# Patient Record
Sex: FEMALE | Race: WHITE
[De-identification: names, ages, dates, MRNs, and addresses within clinical notes are randomized per-mention and may not be internally consistent; named-entity substitution may affect disease eponyms.]

---

## 2017-01-12 ENCOUNTER — HOSPITAL ENCOUNTER (EMERGENCY)
Age: 42
Discharge: HOME | End: 2017-01-12
Payer: COMMERCIAL

## 2017-01-12 DIAGNOSIS — L30.1: Primary | ICD-10-CM

## 2017-01-12 DIAGNOSIS — I10: ICD-10-CM

## 2017-01-12 PROCEDURE — 99283 EMERGENCY DEPT VISIT LOW MDM: CPT

## 2017-08-20 ENCOUNTER — HOSPITAL ENCOUNTER (OUTPATIENT)
Dept: HOSPITAL 76 - EMS | Age: 42
Discharge: ANOTHER HOSPITAL | End: 2017-08-20
Attending: SURGERY
Payer: COMMERCIAL

## 2017-08-20 ENCOUNTER — HOSPITAL ENCOUNTER (EMERGENCY)
Dept: HOSPITAL 76 - ED | Age: 42
LOS: 1 days | Discharge: HOME | End: 2017-08-21
Payer: COMMERCIAL

## 2017-08-20 DIAGNOSIS — S50.811A: ICD-10-CM
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
## 2017-08-20 DIAGNOSIS — I10: ICD-10-CM

## 2017-08-20 DIAGNOSIS — R07.89: Primary | ICD-10-CM
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
## 2017-08-20 DIAGNOSIS — S20.219A: ICD-10-CM

## 2017-08-20 DIAGNOSIS — S30.0XXA: ICD-10-CM
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
## 2017-08-20 DIAGNOSIS — S16.1XXA: Primary | ICD-10-CM
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
## 2017-08-20 PROCEDURE — 71250 CT THORAX DX C-: CPT

## 2017-08-20 PROCEDURE — 72125 CT NECK SPINE W/O DYE: CPT

## 2017-08-20 PROCEDURE — 72170 X-RAY EXAM OF PELVIS: CPT

## 2017-08-20 PROCEDURE — 99283 EMERGENCY DEPT VISIT LOW MDM: CPT

## 2017-08-20 PROCEDURE — 99284 EMERGENCY DEPT VISIT MOD MDM: CPT
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
## 2017-08-20 NOTE — CT PRELIMINARY REPORT
Accession: [id number]

Exam: CT Chest W/O

 

IMPRESSION: Negative noncontrast chest CT.

 

[facility]

 

SITE ID: [id number]

## 2017-08-20 NOTE — CT PRELIMINARY REPORT
Accession: [id number]

Exam: CT Cervical Spine W/O

 

IMPRESSION: 

1. No acute cervical spine fracture. 

2. Mild multilevel degenerative changes without spinal canal or foraminal stenosis.

 

[facility]

 

SITE ID: [id number]

## 2017-08-20 NOTE — XRAY PRELIMINARY REPORT
Accession: [id number]

Exam: XR Pelvis 1 View

 

IMPRESSION: Negative single view pelvis radiography.

 

[facility]

 

SITE ID: [id number]
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
## 2017-08-21 VITALS — DIASTOLIC BLOOD PRESSURE: 82 MMHG | SYSTOLIC BLOOD PRESSURE: 142 MMHG

## 2018-01-07 ENCOUNTER — HOSPITAL ENCOUNTER (EMERGENCY)
Dept: HOSPITAL 76 - ED | Age: 43
Discharge: HOME | End: 2018-01-07
Payer: COMMERCIAL

## 2018-01-07 VITALS — SYSTOLIC BLOOD PRESSURE: 124 MMHG | DIASTOLIC BLOOD PRESSURE: 82 MMHG

## 2018-01-07 DIAGNOSIS — J10.1: Primary | ICD-10-CM

## 2018-01-07 DIAGNOSIS — R00.0: ICD-10-CM

## 2018-01-07 DIAGNOSIS — I10: ICD-10-CM

## 2018-01-07 DIAGNOSIS — R07.9: ICD-10-CM

## 2018-01-07 LAB
ALBUMIN DIAFP-MCNC: 3.9 G/DL (ref 3.2–5.5)
ALBUMIN/GLOB SERPL: 0.9 {RATIO} (ref 1–2.2)
ALP SERPL-CCNC: 60 IU/L (ref 42–121)
ALT SERPL W P-5'-P-CCNC: 61 IU/L (ref 10–60)
ANION GAP SERPL CALCULATED.4IONS-SCNC: 11 MMOL/L (ref 6–13)
AST SERPL W P-5'-P-CCNC: 52 IU/L (ref 10–42)
BASOPHILS NFR BLD AUTO: 0.1 10^3/UL (ref 0–0.1)
BASOPHILS NFR BLD AUTO: 0.6 %
BILIRUB BLD-MCNC: 0.4 MG/DL (ref 0.2–1)
BUN SERPL-MCNC: 12 MG/DL (ref 6–20)
CALCIUM UR-MCNC: 9 MG/DL (ref 8.5–10.3)
CHLORIDE SERPL-SCNC: 105 MMOL/L (ref 101–111)
CO2 SERPL-SCNC: 16 MMOL/L (ref 21–32)
CREAT SERPLBLD-SCNC: 0.8 MG/DL (ref 0.4–1)
EOSINOPHIL # BLD AUTO: 0.1 10^3/UL (ref 0–0.7)
EOSINOPHIL NFR BLD AUTO: 0.9 %
ERYTHROCYTE [DISTWIDTH] IN BLOOD BY AUTOMATED COUNT: 14.7 % (ref 12–15)
GFRSERPLBLD MDRD-ARVRAT: 79 ML/MIN/{1.73_M2} (ref 89–?)
GLOBULIN SER-MCNC: 4.4 G/DL (ref 2.1–4.2)
GLUCOSE SERPL-MCNC: 132 MG/DL (ref 70–100)
HGB UR QL STRIP: 12.8 G/DL (ref 12–16)
INFLUENZA A & B AG INTERPRET: (no result)
LIPASE SERPL-CCNC: 16 U/L (ref 22–51)
LYMPHOCYTES # SPEC AUTO: 0.9 10^3/UL (ref 1.5–3.5)
LYMPHOCYTES NFR BLD AUTO: 9 %
MCH RBC QN AUTO: 25.1 PG (ref 27–31)
MCHC RBC AUTO-ENTMCNC: 32.9 G/DL (ref 32–36)
MCV RBC AUTO: 76.5 FL (ref 81–99)
MONOCYTES # BLD AUTO: 0.9 10^3/UL (ref 0–1)
MONOCYTES NFR BLD AUTO: 8.4 %
NEUTROPHILS # BLD AUTO: 8.4 10^3/UL (ref 1.5–6.6)
NEUTROPHILS # SNV AUTO: 10.3 X10^3/UL (ref 4.8–10.8)
NEUTROPHILS NFR BLD AUTO: 81.1 %
PDW BLD AUTO: 7.9 FL (ref 7.9–10.8)
PLATELET # BLD: 227 10^3/UL (ref 130–450)
PROT SPEC-MCNC: 8.3 G/DL (ref 6.7–8.2)
RBC MAR: 5.09 10^6/UL (ref 4.2–5.4)
SODIUM SERPLBLD-SCNC: 132 MMOL/L (ref 135–145)

## 2018-01-07 PROCEDURE — 84484 ASSAY OF TROPONIN QUANT: CPT

## 2018-01-07 PROCEDURE — 93005 ELECTROCARDIOGRAM TRACING: CPT

## 2018-01-07 PROCEDURE — 83690 ASSAY OF LIPASE: CPT

## 2018-01-07 PROCEDURE — 96361 HYDRATE IV INFUSION ADD-ON: CPT

## 2018-01-07 PROCEDURE — 87276 INFLUENZA A AG IF: CPT

## 2018-01-07 PROCEDURE — 36415 COLL VENOUS BLD VENIPUNCTURE: CPT

## 2018-01-07 PROCEDURE — 99283 EMERGENCY DEPT VISIT LOW MDM: CPT

## 2018-01-07 PROCEDURE — 71045 X-RAY EXAM CHEST 1 VIEW: CPT

## 2018-01-07 PROCEDURE — 87275 INFLUENZA B AG IF: CPT

## 2018-01-07 PROCEDURE — 80053 COMPREHEN METABOLIC PANEL: CPT

## 2018-01-07 PROCEDURE — 96374 THER/PROPH/DIAG INJ IV PUSH: CPT

## 2018-01-07 PROCEDURE — 85025 COMPLETE CBC W/AUTO DIFF WBC: CPT

## 2018-01-07 PROCEDURE — 96375 TX/PRO/DX INJ NEW DRUG ADDON: CPT

## 2018-01-07 NOTE — ED PHYSICIAN DOCUMENTATION
History of Present Illness





- Stated complaint


Stated Complaint: CHEST PX/VOMITING





- Chief complaint


Chief Complaint: Cardiac





- History obtained from


History obtained from: Patient





- History of Present Illness


Timing: Yesterday





- Additonal information


Additional information: 





Patient is a 42 year old female with a history of htn and depression who is 

presenting to the emergency department for fevers, chills, sore throat, vomiting

, and body aches.  patient states that her symptoms started with a sore throat 

three days ago.  patient states that over the last few days her symptoms have 

become progressively worse.  patient states that today she developed chest pain 

and shortness of breath with her other symptoms.  





Review of Systems


Constitutional: reports: Fever, Chills, Myalgias, Fatigue, Sweats


Eyes: denies: Loss of vision


Ears: denies: Ear pain


Nose: reports: Rhinorrhea / runny nose, Congestion


Throat: reports: Sore throat


Cardiac: reports: Chest pain / pressure


Respiratory: reports: Dyspnea, Cough


GI: reports: Abdominal Pain, Nausea, Vomiting


: denies: Dysuria, Frequency


Skin: denies: Rash, Lesions


Neurologic: reports: Generalized weakness.  denies: Focal weakness, Numbness





PD PAST MEDICAL HISTORY





- Past Medical History


Cardiovascular: Hypertension


Endocrine/Autoimmune: None


GI: Cholelithiasis


: None


Psych: Depression, Anxiety


Musculoskeletal: None


Derm: None





- Past Surgical History


Past Surgical History: Yes


General: Cholecystectomy





- Present Medications


Home Medications: 


 Ambulatory Orders











 Medication  Instructions  Recorded  Confirmed


 


Atenolol 50 mg PO DAILY 12/31/14 01/07/18


 


FLUoxetine [PROzac] 20 mg PO DAILY 01/07/18 01/07/18


 


Ondansetron Odt [Zofran] 4 mg TL Q6H PRN #20 tablet 01/07/18 














- Allergies


Allergies/Adverse Reactions: 


 Allergies











Allergy/AdvReac Type Severity Reaction Status Date / Time


 


Penicillins Allergy  Anaphylaxis Verified 01/07/18 20:19














- Social History


Does the pt smoke?: No


Smoking Status: Never smoker


Does the pt drink ETOH?: Yes


Does the pt have substance abuse?: Yes





- Immunizations


Immunizations are current?: Yes





- POLST


Patient has POLST: No





PD ED PE NORMAL





- HEENT


HEENT: Atraumatic





- Neck


Neck: No JVD





- Respiratory


Respiratory: No respiratory distress, Clear bilaterally





- Abdomen


Abdomen: Soft, Non tender, Non distended





- Derm


Derm: Normal color





- Extremities


Extremities: No deformity, No edema





- Neuro


Neuro: Alert and oriented X 3, No motor deficit, No sensory deficit, Normal 

speech





PD ED PE EXPANDED





- General


General: Alert, In distress





- HEENT


HEENT: Nasal congestion, Rhinorrhea





- Cardiac


Cardiac: Tachy





- Derm


Derm: Diaphoretic





Results





- Vitals


Vitals: 


 Vital Signs - 24 hr











  01/07/18 01/07/18





  20:14 21:28


 


Temperature 38.4 C H 


 


Heart Rate 120 H 118 H


 


Respiratory 28 H 18





Rate  


 


Blood Pressure 149/97 H 131/86 H


 


O2 Saturation 100 98








 Oxygen











O2 Source                      Room air

















- EKG (time done)


  ** 2023


Rate: Rate (enter#) (117)


Rhythm: Sinus tachycardia


Axis: Normal


Intervals: Normal UT


QRS: Normal


Compare to prior EKG: Old EKG unavailable





- Labs


Labs: 


 Laboratory Tests











  01/07/18 01/07/18 01/07/18





  20:23 20:25 20:25


 


WBC   10.3 


 


RBC   5.09 


 


Hgb   12.8 


 


Hct   38.9 


 


MCV   76.5 L 


 


MCH   25.1 L 


 


MCHC   32.9 


 


RDW   14.7 


 


Plt Count   227 


 


MPV   7.9 


 


Neut #   8.4 H 


 


Lymph #   0.9 L 


 


Mono #   0.9 


 


Eos #   0.1 


 


Baso #   0.1 


 


Absolute Nucleated RBC   0.00 


 


Nucleated RBC %   0.0 


 


Sodium    132 L


 


Potassium    3.6


 


Chloride    105


 


Carbon Dioxide    16 L


 


Anion Gap    11.0


 


BUN    12


 


Creatinine    0.8


 


Estimated GFR (MDRD)    79 L


 


Glucose    132 H


 


Calcium    9.0


 


Total Bilirubin    0.4


 


AST    52 H


 


ALT    61 H


 


Alkaline Phosphatase    60


 


Troponin I   


 


Total Protein    8.3 H


 


Albumin    3.9


 


Globulin    4.4 H


 


Albumin/Globulin Ratio    0.9 L


 


Lipase    16 L


 


Influenza A (Rapid)  POSITIVE H  


 


Influenza B (Rapid)  Negative  


 


Influenza Types A,B Ag  + H  














  01/07/18





  20:25


 


WBC 


 


RBC 


 


Hgb 


 


Hct 


 


MCV 


 


MCH 


 


MCHC 


 


RDW 


 


Plt Count 


 


MPV 


 


Neut # 


 


Lymph # 


 


Mono # 


 


Eos # 


 


Baso # 


 


Absolute Nucleated RBC 


 


Nucleated RBC % 


 


Sodium 


 


Potassium 


 


Chloride 


 


Carbon Dioxide 


 


Anion Gap 


 


BUN 


 


Creatinine 


 


Estimated GFR (MDRD) 


 


Glucose 


 


Calcium 


 


Total Bilirubin 


 


AST 


 


ALT 


 


Alkaline Phosphatase 


 


Troponin I  < 0.04


 


Total Protein 


 


Albumin 


 


Globulin 


 


Albumin/Globulin Ratio 


 


Lipase 


 


Influenza A (Rapid) 


 


Influenza B (Rapid) 


 


Influenza Types A,B Ag 














- Rads (name of study)


  ** chest x-ray


Radiology: Final report received (no acute abnormality)





PD MEDICAL DECISION MAKING





- ED course


Complexity details: reviewed old records, reviewed results, re-evaluated patient

, considered differential, d/w patient


ED course: 





Patient was seen and examined at bedside.  IV access was gained, labs were 

drawn.  Fluid bolus was started.  ekg was performed and showed sinus tach.  

patient was treated with toradol, zofran.  Patient's flu was was positive.  A 

second liter bolus was ordered as well as tylenol.  Patient's tachycardia and 

fever improved.  Patient had no episodes of emesis while in the emergency 

department.  patient required no further work up and was stable for discharge 

with outpatient follow up.  





Departure





- Departure


Disposition: 01 Home, Self Care


Clinical Impression: 


 Influenza A





Condition: Good


Instructions:  ED Flu


Follow-Up: 


primary,care provider [Other] - As Needed


Prescriptions: 


Ondansetron Odt [Zofran] 4 mg TL Q6H PRN #20 tablet


 PRN Reason: Nausea / Vomiting


Comments: 


Your symptoms today are being caused by influenza A.  There is nothing to kill 

the virus specifically so we treat the symptoms.  You will be prescribed zofran 

for nausea and can take over the counter cold and flu medicine.  It is 

important to stay well hydrated with at least 100oz a day.  You are contagious 

so it is important that you wash your hands religiously.  You should follow up 

with your doctor if your symptoms persist for more than the next 4 days.  You 

may return to the emergency department at any time for new, worsening or 

uncontrollable symptoms.  


Forms:  Activity restrictions

## 2018-01-07 NOTE — XRAY PRELIMINARY REPORT
Accession: [id number]

Exam: XR CHEST 1 VIEW X-RAY

 

IMPRESSION: No acute cardiopulmonary abnormality. 

 

[facility]

 

SITE ID: [id number]

## 2018-01-07 NOTE — XRAY REPORT
EXAM: 

CHEST RADIOGRAPHY

 

EXAM DATE: 1/7/2018 09:07 PM.

 

CLINICAL HISTORY: Fever and cough

 

COMPARISON: CT chest 08/20/2017.

 

TECHNIQUE: 1 view.

 

FINDINGS:

Lungs/Pleura: No focal consolidation or evidence of edema. No pleural effusion or pneumothorax. 

 

Mediastinum: Normal cardiomediastinal contour. 

 

Other: The bones are unremarkable.

 

IMPRESSION: No acute cardiopulmonary abnormality. 

 

[facility]

Referring Provider Line: [phone]

 

SITE ID: [id number]

## 2018-03-04 ENCOUNTER — HOSPITAL ENCOUNTER (EMERGENCY)
Dept: HOSPITAL 76 - ED | Age: 43
Discharge: HOME | End: 2018-03-04
Payer: COMMERCIAL

## 2018-03-04 VITALS — SYSTOLIC BLOOD PRESSURE: 141 MMHG | DIASTOLIC BLOOD PRESSURE: 98 MMHG

## 2018-03-04 DIAGNOSIS — M62.838: ICD-10-CM
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
## 2018-03-04 DIAGNOSIS — S16.1XXA: Primary | ICD-10-CM

## 2018-03-04 DIAGNOSIS — I10: ICD-10-CM

## 2018-03-04 PROCEDURE — 96372 THER/PROPH/DIAG INJ SC/IM: CPT

## 2018-03-04 PROCEDURE — 99283 EMERGENCY DEPT VISIT LOW MDM: CPT
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
## 2018-05-30 ENCOUNTER — HOSPITAL ENCOUNTER (EMERGENCY)
Dept: HOSPITAL 76 - ED | Age: 43
Discharge: HOME | End: 2018-05-30
Payer: COMMERCIAL

## 2018-05-30 VITALS — DIASTOLIC BLOOD PRESSURE: 90 MMHG | SYSTOLIC BLOOD PRESSURE: 150 MMHG

## 2018-05-30 DIAGNOSIS — I10: ICD-10-CM

## 2018-05-30 DIAGNOSIS — L73.9: Primary | ICD-10-CM

## 2018-05-30 PROCEDURE — 87205 SMEAR GRAM STAIN: CPT

## 2018-05-30 PROCEDURE — 87070 CULTURE OTHR SPECIMN AEROBIC: CPT

## 2018-05-30 PROCEDURE — 99283 EMERGENCY DEPT VISIT LOW MDM: CPT

## 2018-05-30 NOTE — ED PHYSICIAN DOCUMENTATION
PD HPI SKIN





- Stated complaint


Stated Complaint: BODY RASH





- Chief complaint


Chief Complaint: Wound





- History obtained from


History obtained from: Patient





- History of Present Illness


Timing - onset: Other (Left 3 days worth of an itchy rash especially the face 

and upper chest without fevers.  No sick contacts or recent travel.  No hot tub 

use.)





Review of Systems


Constitutional: denies: Fever, Chills


Ears: denies: Loss of hearing, Ear pain


Nose: denies: Rhinorrhea / runny nose, Congestion


Cardiac: denies: Chest pain / pressure, Palpitations


Respiratory: denies: Dyspnea, Cough





PD PAST MEDICAL HISTORY





- Past Medical History


Cardiovascular: Hypertension


Endocrine/Autoimmune: None


GI: Cholelithiasis


: None


Psych: Depression, Anxiety


Musculoskeletal: None


Derm: None





- Past Surgical History


Past Surgical History: Yes


General: Cholecystectomy





- Present Medications


Home Medications: 


 Ambulatory Orders











 Medication  Instructions  Recorded  Confirmed


 


Atenolol 50 mg PO DAILY 12/31/14 01/07/18


 


FLUoxetine [PROzac] 20 mg PO DAILY 01/07/18 01/07/18


 


Ondansetron Odt [Zofran] 4 mg TL Q6H PRN #20 tablet 01/07/18 


 


Cyclobenzaprine [Flexeril] 10 mg PO TID PRN #10 tablet 03/04/18 


 


Lidocaine Patch 5% [Lidoderm Patch] 1 each TOP DAILY #14 patch 03/04/18 


 


Cephalexin [Keflex] 500 mg PO QID #28 capsule 05/30/18 














- Allergies


Allergies/Adverse Reactions: 


 Allergies











Allergy/AdvReac Type Severity Reaction Status Date / Time


 


Penicillins Allergy  Anaphylaxis Verified 05/30/18 20:05














- Social History


Does the pt smoke?: No


Smoking Status: Never smoker


Does the pt drink ETOH?: Yes


Does the pt have substance abuse?: Yes





- Immunizations


Immunizations are current?: Yes





- POLST


Patient has POLST: No





PD ED PE NORMAL





- Vitals


Vital signs reviewed: Yes





- General


General: Alert and oriented X 3, No acute distress





- HEENT


HEENT: PERRL, EOMI





- Derm


Derm: Other (She has folliculitis especially the upper chest and neck 

anteriorly.  There was one spot on the left neck that was the largest, still 

less than a millimeter.  It was prepped with alcohol and expressed with a 

needle and sent for culture during examination.)





- Neuro


Neuro: Alert and oriented X 3, Normal speech





Results





- Vitals


Vitals: 


 Vital Signs - 24 hr











  05/30/18





  20:01


 


Temperature 36.0 C L


 


Heart Rate 71


 


Respiratory 14





Rate 


 


Blood Pressure 160/99 H


 


O2 Saturation 100








 Oxygen











O2 Source                      Room air

















Departure





- Departure


Disposition: 01 Home, Self Care


Clinical Impression: 


 Folliculitis





Condition: Good


Record reviewed to determine appropriate education?: Yes


Instructions:  Folliculitis


Prescriptions: 


Cephalexin [Keflex] 500 mg PO QID #28 capsule


Comments: 


We are performing a wound culture, the results should be done in 48-72 hours.  

If antibiotic change is necessary we will call you.  Return if worse in the 

meantime, especially if you develop increased pain, fevers, cannot keep down 

the medication.  Otherwise follow-up with your physician in approximately 2-3 

days.





Your blood pressure was elevated today on check into the emergency department.  

This does not mean that you have hypertension, it is a common phenomenon to 

come to the emergency department and have elevated blood pressure.  I recommend 

that you see your primary care physician within the week to have it rechecked 

when you are feeling better.